# Patient Record
Sex: MALE | Race: WHITE | NOT HISPANIC OR LATINO | Employment: OTHER | URBAN - METROPOLITAN AREA
[De-identification: names, ages, dates, MRNs, and addresses within clinical notes are randomized per-mention and may not be internally consistent; named-entity substitution may affect disease eponyms.]

---

## 2023-06-03 ENCOUNTER — HOSPITAL ENCOUNTER (EMERGENCY)
Facility: CLINIC | Age: 28
Discharge: HOME | End: 2023-06-04
Attending: EMERGENCY MEDICINE | Admitting: EMERGENCY MEDICINE

## 2023-06-03 DIAGNOSIS — F10.920 ALCOHOLIC INTOXICATION WITHOUT COMPLICATION (H): ICD-10-CM

## 2023-06-03 PROCEDURE — 99285 EMERGENCY DEPT VISIT HI MDM: CPT | Mod: 25

## 2023-06-03 ASSESSMENT — ACTIVITIES OF DAILY LIVING (ADL): ADLS_ACUITY_SCORE: 35

## 2023-06-04 VITALS
DIASTOLIC BLOOD PRESSURE: 80 MMHG | RESPIRATION RATE: 14 BRPM | TEMPERATURE: 98.2 F | OXYGEN SATURATION: 99 % | SYSTOLIC BLOOD PRESSURE: 119 MMHG | HEART RATE: 76 BPM

## 2023-06-04 PROCEDURE — 90471 IMMUNIZATION ADMIN: CPT | Performed by: EMERGENCY MEDICINE

## 2023-06-04 PROCEDURE — 250N000011 HC RX IP 250 OP 636: Performed by: EMERGENCY MEDICINE

## 2023-06-04 PROCEDURE — 90715 TDAP VACCINE 7 YRS/> IM: CPT | Performed by: EMERGENCY MEDICINE

## 2023-06-04 RX ADMIN — CLOSTRIDIUM TETANI TOXOID ANTIGEN (FORMALDEHYDE INACTIVATED), CORYNEBACTERIUM DIPHTHERIAE TOXOID ANTIGEN (FORMALDEHYDE INACTIVATED), BORDETELLA PERTUSSIS TOXOID ANTIGEN (GLUTARALDEHYDE INACTIVATED), BORDETELLA PERTUSSIS FILAMENTOUS HEMAGGLUTININ ANTIGEN (FORMALDEHYDE INACTIVATED), BORDETELLA PERTUSSIS PERTACTIN ANTIGEN, AND BORDETELLA PERTUSSIS FIMBRIAE 2/3 ANTIGEN 0.5 ML: 5; 2; 2.5; 5; 3; 5 INJECTION, SUSPENSION INTRAMUSCULAR at 06:28

## 2023-06-04 ASSESSMENT — ACTIVITIES OF DAILY LIVING (ADL)
ADLS_ACUITY_SCORE: 35
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
## 2023-06-04 NOTE — ED NOTES
Discharge instructions provided with  Ipad. Pt offered telephone use, declined. Declined breakfast only requests water. Tolerates PO. Provided 3 bus tokens on discharge.

## 2023-06-28 ENCOUNTER — APPOINTMENT (OUTPATIENT)
Dept: INTERPRETER SERVICES | Facility: CLINIC | Age: 28
End: 2023-06-28

## 2023-07-17 ENCOUNTER — HOSPITAL ENCOUNTER (EMERGENCY)
Facility: CLINIC | Age: 28
Discharge: HOME | End: 2023-07-17
Attending: EMERGENCY MEDICINE | Admitting: EMERGENCY MEDICINE

## 2023-07-17 ENCOUNTER — APPOINTMENT (OUTPATIENT)
Dept: GENERAL RADIOLOGY | Facility: CLINIC | Age: 28
End: 2023-07-17
Attending: EMERGENCY MEDICINE

## 2023-07-17 VITALS
WEIGHT: 138.89 LBS | HEART RATE: 77 BPM | RESPIRATION RATE: 20 BRPM | TEMPERATURE: 97.8 F | BODY MASS INDEX: 23.14 KG/M2 | HEIGHT: 65 IN | SYSTOLIC BLOOD PRESSURE: 156 MMHG | OXYGEN SATURATION: 98 % | DIASTOLIC BLOOD PRESSURE: 82 MMHG

## 2023-07-17 DIAGNOSIS — S63.682A SPRAIN OF OTHER SITE OF LEFT THUMB, INITIAL ENCOUNTER: ICD-10-CM

## 2023-07-17 DIAGNOSIS — S60.10XA SUBUNGUAL HEMATOMA OF FINGER, INITIAL ENCOUNTER: ICD-10-CM

## 2023-07-17 PROCEDURE — 73140 X-RAY EXAM OF FINGER(S): CPT | Mod: LT

## 2023-07-17 PROCEDURE — 99283 EMERGENCY DEPT VISIT LOW MDM: CPT
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
## 2023-07-17 NOTE — ED PROVIDER NOTES
"    History     Chief Complaint:  Thumb Discomfort       HPI   Serg Crenshaw is a 28 year old male who presents after slamming his left thumb in a car door 2 days ago.  There is a large subungual hematoma.  This has not been bleeding or draining.  He does have some tenderness in the thumb but has full range of motion of the hand.  No other injuries or complaints.  Not on blood thinners      Independent Historian:    The patient    Review of External Notes:  None    Medications:    No current outpatient medications on file.      Past Medical History:    No past medical history on file.    Past Surgical History:    No past surgical history on file.       Physical Exam     Patient Vitals for the past 24 hrs:   BP Temp Temp src Pulse Resp SpO2 Height Weight   07/17/23 1033 (!) 156/82 97.8  F (36.6  C) Temporal 77 20 98 % 1.65 m (5' 4.96\") 63 kg (138 lb 14.2 oz)        Physical Exam  Constitutional: Vital signs reviewed.  Pleasant.  HEENT: Moist mucous membranes  Cardiovascular: Regular rate and rhythm  Pulmonary/Chest: Breathing comfortably on room air.  No audible wheezing  Musculoskeletal/Extremities: No bony deformities.  Moves all 4 extremities without difficulty.  Large subungual hematoma under the left thumb.  No bony deformities to the thumb or hand.  He has normal sensation.  No active bleeding.  Full range of motion.  Neurological: Alert.  No focal deficits.  Endo: No pitting edema  Skin: No visible rash.  Psychiatric: Pleasant.      Emergency Department Course     Imaging:  Fingers XR, 2-3 views, left   Final Result   IMPRESSION: No fractures are evident. Normal joint spacing and   alignment. Small chronic ossicle near the head of the ulna.      JUICE LY MD            SYSTEM ID:  [id number]        Report per radiology      Independent Interpretation (X-rays, CTs, rhythm strip):  No fracture of the thumb per my interpretation    Social Determinants of Health affecting care:  [place]ian-speaking   "   Disposition:  The patient was discharged to home.     Impression & Plan        Medical Decision Making:  Slammed his thumb in a car door 2 days ago.  Is a large subungual hematoma.  X-rays negative for fracture.  Unfortunately the blood is likely congealed and trephination of the nail would not be successful.  He will continue to ice and use NSAIDs for pain control.  Follow-up as needed.    Diagnosis:    ICD-10-CM    1. Sprain of other site of left thumb, initial encounter  S63.682A       2. Subungual hematoma of finger, initial encounter  S60.10XA            Discharge Medications:  New Prescriptions    No medications on file          Tito Alexander MD  7/17/2023   Tito Alexander MD Walters, Brent Aaron, MD  07/17/23 1134